# Patient Record
Sex: FEMALE | Race: WHITE | NOT HISPANIC OR LATINO | URBAN - METROPOLITAN AREA
[De-identification: names, ages, dates, MRNs, and addresses within clinical notes are randomized per-mention and may not be internally consistent; named-entity substitution may affect disease eponyms.]

---

## 2017-04-30 ENCOUNTER — EMERGENCY (EMERGENCY)
Facility: HOSPITAL | Age: 22
LOS: 1 days | Discharge: HOME | End: 2017-04-30
Attending: EMERGENCY MEDICINE | Admitting: EMERGENCY MEDICINE
Payer: MEDICAID

## 2017-04-30 VITALS
TEMPERATURE: 102 F | RESPIRATION RATE: 16 BRPM | OXYGEN SATURATION: 100 % | HEART RATE: 119 BPM | SYSTOLIC BLOOD PRESSURE: 103 MMHG | DIASTOLIC BLOOD PRESSURE: 68 MMHG

## 2017-04-30 DIAGNOSIS — M62.40 CONTRACTURE OF MUSCLE, UNSPECIFIED SITE: Chronic | ICD-10-CM

## 2017-04-30 DIAGNOSIS — Z98.89 OTHER SPECIFIED POSTPROCEDURAL STATES: Chronic | ICD-10-CM

## 2017-04-30 PROCEDURE — 99284 EMERGENCY DEPT VISIT MOD MDM: CPT | Mod: 25

## 2017-04-30 PROCEDURE — 93010 ELECTROCARDIOGRAM REPORT: CPT

## 2017-04-30 NOTE — ED ADULT TRIAGE NOTE - CHIEF COMPLAINT QUOTE
pt with complaints of throat pain, headache and dizziness since last night which has been getting progressively worse, pt noted to have temp 101.9 which she stated she had since last night as well, denies and sick contacts, able to tolerate PO at this time. pt has a history of seizures on Keppra, complaint with medications last seizure 3 years ago.  pt states the last time she had a headache and felt dizzy she had a seizure, denies chest pain and sob at this time

## 2017-05-01 VITALS
HEART RATE: 101 BPM | DIASTOLIC BLOOD PRESSURE: 69 MMHG | TEMPERATURE: 99 F | SYSTOLIC BLOOD PRESSURE: 124 MMHG | OXYGEN SATURATION: 100 % | RESPIRATION RATE: 22 BRPM

## 2017-05-01 RX ORDER — AZITHROMYCIN 500 MG/1
500 TABLET, FILM COATED ORAL ONCE
Qty: 0 | Refills: 0 | Status: COMPLETED | OUTPATIENT
Start: 2017-05-01 | End: 2017-05-01

## 2017-05-01 RX ORDER — DEXAMETHASONE 0.5 MG/5ML
10 ELIXIR ORAL ONCE
Qty: 0 | Refills: 0 | Status: DISCONTINUED | OUTPATIENT
Start: 2017-05-01 | End: 2017-05-01

## 2017-05-01 RX ORDER — IBUPROFEN 200 MG
800 TABLET ORAL ONCE
Qty: 0 | Refills: 0 | Status: COMPLETED | OUTPATIENT
Start: 2017-05-01 | End: 2017-05-01

## 2017-05-01 RX ORDER — AZITHROMYCIN 500 MG/1
1 TABLET, FILM COATED ORAL
Qty: 4 | Refills: 0 | OUTPATIENT
Start: 2017-05-01 | End: 2017-05-05

## 2017-05-01 RX ORDER — AZITHROMYCIN 500 MG/1
0.5 TABLET, FILM COATED ORAL
Qty: 2 | Refills: 0 | OUTPATIENT
Start: 2017-05-01 | End: 2017-05-05

## 2017-05-01 RX ORDER — KETOROLAC TROMETHAMINE 30 MG/ML
30 SYRINGE (ML) INJECTION ONCE
Qty: 0 | Refills: 0 | Status: DISCONTINUED | OUTPATIENT
Start: 2017-05-01 | End: 2017-05-01

## 2017-05-01 RX ORDER — SODIUM CHLORIDE 9 MG/ML
1000 INJECTION INTRAMUSCULAR; INTRAVENOUS; SUBCUTANEOUS ONCE
Qty: 0 | Refills: 0 | Status: DISCONTINUED | OUTPATIENT
Start: 2017-05-01 | End: 2017-05-01

## 2017-05-01 RX ORDER — DEXAMETHASONE 0.5 MG/5ML
6 ELIXIR ORAL ONCE
Qty: 0 | Refills: 0 | Status: COMPLETED | OUTPATIENT
Start: 2017-05-01 | End: 2017-05-01

## 2017-05-01 RX ORDER — ACETAMINOPHEN 500 MG
650 TABLET ORAL ONCE
Qty: 0 | Refills: 0 | Status: COMPLETED | OUTPATIENT
Start: 2017-05-01 | End: 2017-05-01

## 2017-05-01 RX ADMIN — Medication 650 MILLIGRAM(S): at 01:46

## 2017-05-01 RX ADMIN — AZITHROMYCIN 500 MILLIGRAM(S): 500 TABLET, FILM COATED ORAL at 01:31

## 2017-05-01 RX ADMIN — Medication 800 MILLIGRAM(S): at 01:47

## 2017-05-01 RX ADMIN — Medication 6 MILLIGRAM(S): at 01:46

## 2017-05-01 NOTE — ED PROVIDER NOTE - PSH
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

## 2017-05-01 NOTE — ED PROVIDER NOTE - CARE PLAN
Principal Discharge DX:	Strep pharyngitis  Instructions for follow-up, activity and diet:	The patient was given verbal and written discharge instructions. Specifically, instructions when to return to the ED and when to seek follow-up from their pcp was discussed. Any specialty follow-up was discussed, including how to make an appointment.  Instructions were discussed in simple, plain language and was understood by the patient. The patient understands that should their symptoms worsen or any new symptoms arise, they should return to the ED immediately for further evaluation.

## 2017-05-01 NOTE — ED ADULT NURSE NOTE - OBJECTIVE STATEMENT
pt received spot 20, alert and orientedx3. pt c.o headache dizziness fever starting last night, worsening today. pt states she has hx of seizures with similar symptoms. also c.o difficulty swallowing. sinus tach on cm. md silva at bedside. denies seizure activity. labs sent. will monitor.

## 2017-05-01 NOTE — ED PROVIDER NOTE - PROGRESS NOTE DETAILS
pt drank 2L water with subsequent improvement of hr. feels better, sore throat improved. Pt prepared for dc to home and will followup with PMD and take abx as prescribed.

## 2017-05-01 NOTE — ED PROVIDER NOTE - FAMILY HISTORY
Grandparent  Still living? Unknown  Family history of diabetes mellitus in grandmother, Age at diagnosis: Age Unknown  Family history of Alzheimer's disease, Age at diagnosis: Age Unknown

## 2017-05-01 NOTE — ED PROVIDER NOTE - ATTENDING CONTRIBUTION TO CARE
21F h/o athrogryposis, seizures presents with fever, sore throat and HA.  Reports 2d of symptoms.  ST is primarily on left side, more painful to swallow.  Reports HA is diffuse and is associated w fever, improves when fever is reduced.  No neck stiffness, no cough.  No rash, no n/v/d. No abd pain. In the past has had seizures triggered by fevers, no seizures now.  On exam well appearing, nad, no drooling, normal voice, no trismus, + L mild tonilsar erythema, scattered exudate, + L anterior LAD, mmm, lungs clear, tachycardic, abd soft, no rash, no edema, 2+ pulses, alert, speech clear.  Symptoms c/w strep pharyngitis, will treat w abx.  + tachycardia likely due to fever, dehydration, IV infiltrated and pt requested PO fluids (difficult to obtain access), tachycardia improved with antipyretics, fluids, mild persistent tachycardia but symptomatically improved, given strict instructions for rehydration and close f/u in 1-2 days w PCP, strict return precautions.

## 2017-05-01 NOTE — ED PROVIDER NOTE - OBJECTIVE STATEMENT
21yF hx epilepsy (takes keppra), arthrogyposis p/w throat pain, fever, chills, headache, lightheadedness since yesterday night. The last time pt had a seizure (3yrs ago) she had similar symptoms aside for the sore throat. Throat pain is worse when swallowing but pt able to swallow and endorses no difficulty breathing. No known sick contacts or recent travel. Headache gradual onset, not worse with movement or light, waxing and waning, similar to headaches she has had in past, not severe. No neck stiffness. No motor, sensory, or visual deficits.

## 2017-05-02 LAB
LEVETIRACETAM SERPL-MCNC: 17.9 MCG/ML — SIGNIFICANT CHANGE UP (ref 12–46)
SPECIMEN SOURCE: SIGNIFICANT CHANGE UP
SPECIMEN SOURCE: SIGNIFICANT CHANGE UP

## 2017-05-06 LAB
BACTERIA BLD CULT: SIGNIFICANT CHANGE UP
BACTERIA BLD CULT: SIGNIFICANT CHANGE UP

## 2017-07-17 ENCOUNTER — APPOINTMENT (OUTPATIENT)
Dept: NEUROLOGY | Facility: CLINIC | Age: 22
End: 2017-07-17

## 2017-07-17 VITALS
HEART RATE: 75 BPM | WEIGHT: 246 LBS | SYSTOLIC BLOOD PRESSURE: 120 MMHG | DIASTOLIC BLOOD PRESSURE: 74 MMHG | HEIGHT: 64 IN | BODY MASS INDEX: 42 KG/M2

## 2017-07-18 ENCOUNTER — MEDICATION RENEWAL (OUTPATIENT)
Age: 22
End: 2017-07-18

## 2018-01-16 ENCOUNTER — MEDICATION RENEWAL (OUTPATIENT)
Age: 23
End: 2018-01-16

## 2018-10-24 ENCOUNTER — MEDICATION RENEWAL (OUTPATIENT)
Age: 23
End: 2018-10-24

## 2019-06-05 ENCOUNTER — MEDICATION RENEWAL (OUTPATIENT)
Age: 24
End: 2019-06-05

## 2019-06-25 ENCOUNTER — APPOINTMENT (OUTPATIENT)
Dept: OBGYN | Facility: HOSPITAL | Age: 24
End: 2019-06-25

## 2019-07-11 ENCOUNTER — APPOINTMENT (OUTPATIENT)
Dept: OBGYN | Facility: HOSPITAL | Age: 24
End: 2019-07-11

## 2019-08-20 ENCOUNTER — LABORATORY RESULT (OUTPATIENT)
Age: 24
End: 2019-08-20

## 2019-08-20 ENCOUNTER — OUTPATIENT (OUTPATIENT)
Dept: OUTPATIENT SERVICES | Facility: HOSPITAL | Age: 24
LOS: 1 days | End: 2019-08-20

## 2019-08-20 ENCOUNTER — APPOINTMENT (OUTPATIENT)
Dept: OBGYN | Facility: HOSPITAL | Age: 24
End: 2019-08-20
Payer: MEDICAID

## 2019-08-20 VITALS
HEIGHT: 64 IN | BODY MASS INDEX: 46.44 KG/M2 | WEIGHT: 272 LBS | HEART RATE: 64 BPM | DIASTOLIC BLOOD PRESSURE: 76 MMHG | SYSTOLIC BLOOD PRESSURE: 133 MMHG

## 2019-08-20 DIAGNOSIS — Z98.89 OTHER SPECIFIED POSTPROCEDURAL STATES: Chronic | ICD-10-CM

## 2019-08-20 DIAGNOSIS — Z00.00 ENCOUNTER FOR GENERAL ADULT MEDICAL EXAMINATION W/OUT ABNORMAL FINDINGS: ICD-10-CM

## 2019-08-20 DIAGNOSIS — M62.40 CONTRACTURE OF MUSCLE, UNSPECIFIED SITE: Chronic | ICD-10-CM

## 2019-08-20 LAB
ALBUMIN SERPL ELPH-MCNC: 4.6 G/DL — SIGNIFICANT CHANGE UP (ref 3.3–5)
ALP SERPL-CCNC: 128 U/L — HIGH (ref 40–120)
ALT FLD-CCNC: 14 U/L — SIGNIFICANT CHANGE UP (ref 4–33)
ANION GAP SERPL CALC-SCNC: 15 MMO/L — HIGH (ref 7–14)
AST SERPL-CCNC: 15 U/L — SIGNIFICANT CHANGE UP (ref 4–32)
BASOPHILS # BLD AUTO: 0.04 K/UL — SIGNIFICANT CHANGE UP (ref 0–0.2)
BASOPHILS NFR BLD AUTO: 0.3 % — SIGNIFICANT CHANGE UP (ref 0–2)
BILIRUB SERPL-MCNC: < 0.2 MG/DL — LOW (ref 0.2–1.2)
BUN SERPL-MCNC: 9 MG/DL — SIGNIFICANT CHANGE UP (ref 7–23)
CALCIUM SERPL-MCNC: 9.9 MG/DL — SIGNIFICANT CHANGE UP (ref 8.4–10.5)
CHLORIDE SERPL-SCNC: 100 MMOL/L — SIGNIFICANT CHANGE UP (ref 98–107)
CO2 SERPL-SCNC: 25 MMOL/L — SIGNIFICANT CHANGE UP (ref 22–31)
CREAT SERPL-MCNC: 0.84 MG/DL — SIGNIFICANT CHANGE UP (ref 0.5–1.3)
EOSINOPHIL # BLD AUTO: 0.19 K/UL — SIGNIFICANT CHANGE UP (ref 0–0.5)
EOSINOPHIL NFR BLD AUTO: 1.5 % — SIGNIFICANT CHANGE UP (ref 0–6)
GLUCOSE SERPL-MCNC: 84 MG/DL — SIGNIFICANT CHANGE UP (ref 70–99)
HBA1C BLD-MCNC: 5.1 % — SIGNIFICANT CHANGE UP (ref 4–5.6)
HCT VFR BLD CALC: 37.4 % — SIGNIFICANT CHANGE UP (ref 34.5–45)
HGB BLD-MCNC: 11 G/DL — LOW (ref 11.5–15.5)
IMM GRANULOCYTES NFR BLD AUTO: 0.3 % — SIGNIFICANT CHANGE UP (ref 0–1.5)
LYMPHOCYTES # BLD AUTO: 32.4 % — SIGNIFICANT CHANGE UP (ref 13–44)
LYMPHOCYTES # BLD AUTO: 4.22 K/UL — HIGH (ref 1–3.3)
MCHC RBC-ENTMCNC: 22.6 PG — LOW (ref 27–34)
MCHC RBC-ENTMCNC: 29.4 % — LOW (ref 32–36)
MCV RBC AUTO: 77 FL — LOW (ref 80–100)
MONOCYTES # BLD AUTO: 0.83 K/UL — SIGNIFICANT CHANGE UP (ref 0–0.9)
MONOCYTES NFR BLD AUTO: 6.4 % — SIGNIFICANT CHANGE UP (ref 2–14)
NEUTROPHILS # BLD AUTO: 7.72 K/UL — HIGH (ref 1.8–7.4)
NEUTROPHILS NFR BLD AUTO: 59.1 % — SIGNIFICANT CHANGE UP (ref 43–77)
NRBC # FLD: 0 K/UL — SIGNIFICANT CHANGE UP (ref 0–0)
PLATELET # BLD AUTO: 609 K/UL — HIGH (ref 150–400)
PMV BLD: 9.5 FL — SIGNIFICANT CHANGE UP (ref 7–13)
POTASSIUM SERPL-MCNC: 3.8 MMOL/L — SIGNIFICANT CHANGE UP (ref 3.5–5.3)
POTASSIUM SERPL-SCNC: 3.8 MMOL/L — SIGNIFICANT CHANGE UP (ref 3.5–5.3)
PROT SERPL-MCNC: 8.8 G/DL — HIGH (ref 6–8.3)
RBC # BLD: 4.86 M/UL — SIGNIFICANT CHANGE UP (ref 3.8–5.2)
RBC # FLD: 16.4 % — HIGH (ref 10.3–14.5)
SODIUM SERPL-SCNC: 140 MMOL/L — SIGNIFICANT CHANGE UP (ref 135–145)
T4 FREE SERPL-MCNC: 1.25 NG/DL — SIGNIFICANT CHANGE UP (ref 0.9–1.8)
TSH SERPL-MCNC: 1.12 UIU/ML — SIGNIFICANT CHANGE UP (ref 0.27–4.2)
WBC # BLD: 13.04 K/UL — HIGH (ref 3.8–10.5)
WBC # FLD AUTO: 13.04 K/UL — HIGH (ref 3.8–10.5)

## 2019-08-20 PROCEDURE — 99213 OFFICE O/P EST LOW 20 MIN: CPT | Mod: GC

## 2019-08-20 RX ORDER — TERCONAZOLE 8 MG/G
0.8 CREAM VAGINAL
Qty: 1 | Refills: 0 | Status: ACTIVE | COMMUNITY
Start: 2019-08-20 | End: 1900-01-01

## 2019-08-20 NOTE — PHYSICAL EXAM
[Awake] : awake [Alert] : alert [Oriented x3] : oriented to person, place, and time [No Lesions] : no genitalia lesions [Normal] : external genitalia [Labia Majora] : labia major [Discharge] : a  ~M vaginal discharge was present [Moderate] : moderate [Melia] : yellow [Thick] : thick [Mass] : no breast mass [Acute Distress] : no acute distress [Tender] : no tenderness [Nipple Discharge] : no nipple discharge [Axillary LAD] : no axillary lymphadenopathy [Depressed Mood] : not depressed [Flat Affect] : affect not flat [Foul Smelling] : not foul smelling [FreeTextEntry6] : deferred

## 2019-08-21 DIAGNOSIS — Z00.00 ENCOUNTER FOR GENERAL ADULT MEDICAL EXAMINATION WITHOUT ABNORMAL FINDINGS: ICD-10-CM

## 2019-08-21 DIAGNOSIS — N92.6 IRREGULAR MENSTRUATION, UNSPECIFIED: ICD-10-CM

## 2019-08-21 DIAGNOSIS — N89.8 OTHER SPECIFIED NONINFLAMMATORY DISORDERS OF VAGINA: ICD-10-CM

## 2019-08-22 ENCOUNTER — APPOINTMENT (OUTPATIENT)
Dept: ULTRASOUND IMAGING | Facility: CLINIC | Age: 24
End: 2019-08-22

## 2019-08-22 ENCOUNTER — CHART COPY (OUTPATIENT)
Age: 24
End: 2019-08-22

## 2019-08-22 ENCOUNTER — OUTPATIENT (OUTPATIENT)
Dept: OUTPATIENT SERVICES | Facility: HOSPITAL | Age: 24
LOS: 1 days | End: 2019-08-22
Payer: MEDICAID

## 2019-08-22 DIAGNOSIS — N76.0 ACUTE VAGINITIS: ICD-10-CM

## 2019-08-22 DIAGNOSIS — M62.40 CONTRACTURE OF MUSCLE, UNSPECIFIED SITE: Chronic | ICD-10-CM

## 2019-08-22 DIAGNOSIS — B96.89 ACUTE VAGINITIS: ICD-10-CM

## 2019-08-22 DIAGNOSIS — Z00.8 ENCOUNTER FOR OTHER GENERAL EXAMINATION: ICD-10-CM

## 2019-08-22 DIAGNOSIS — Z98.89 OTHER SPECIFIED POSTPROCEDURAL STATES: Chronic | ICD-10-CM

## 2019-08-22 LAB
CANDIDA AB TITR SER: DETECTED — SIGNIFICANT CHANGE UP
G VAGINALIS DNA SPEC QL NAA+PROBE: DETECTED — SIGNIFICANT CHANGE UP
T VAGINALIS SPEC QL WET PREP: NOT DETECTED — SIGNIFICANT CHANGE UP

## 2019-08-22 PROCEDURE — 76856 US EXAM PELVIC COMPLETE: CPT

## 2019-08-22 PROCEDURE — 76856 US EXAM PELVIC COMPLETE: CPT | Mod: 26

## 2019-09-03 ENCOUNTER — APPOINTMENT (OUTPATIENT)
Dept: OBGYN | Facility: HOSPITAL | Age: 24
End: 2019-09-03
Payer: MEDICAID

## 2019-09-03 ENCOUNTER — OUTPATIENT (OUTPATIENT)
Dept: OUTPATIENT SERVICES | Facility: HOSPITAL | Age: 24
LOS: 1 days | End: 2019-09-03

## 2019-09-03 VITALS
BODY MASS INDEX: 46.61 KG/M2 | HEIGHT: 64 IN | SYSTOLIC BLOOD PRESSURE: 124 MMHG | WEIGHT: 273 LBS | HEART RATE: 86 BPM | DIASTOLIC BLOOD PRESSURE: 73 MMHG

## 2019-09-03 DIAGNOSIS — M62.40 CONTRACTURE OF MUSCLE, UNSPECIFIED SITE: Chronic | ICD-10-CM

## 2019-09-03 DIAGNOSIS — Z98.89 OTHER SPECIFIED POSTPROCEDURAL STATES: Chronic | ICD-10-CM

## 2019-09-03 DIAGNOSIS — E28.2 POLYCYSTIC OVARIAN SYNDROME: ICD-10-CM

## 2019-09-03 PROCEDURE — 99213 OFFICE O/P EST LOW 20 MIN: CPT | Mod: GE

## 2019-12-27 ENCOUNTER — RX RENEWAL (OUTPATIENT)
Age: 24
End: 2019-12-27

## 2020-10-02 ENCOUNTER — APPOINTMENT (OUTPATIENT)
Dept: NEUROLOGY | Facility: CLINIC | Age: 25
End: 2020-10-02

## 2020-10-30 ENCOUNTER — APPOINTMENT (OUTPATIENT)
Dept: NEUROLOGY | Facility: CLINIC | Age: 25
End: 2020-10-30
Payer: MEDICAID

## 2020-10-30 VITALS
DIASTOLIC BLOOD PRESSURE: 87 MMHG | BODY MASS INDEX: 47.8 KG/M2 | HEART RATE: 96 BPM | HEIGHT: 64 IN | SYSTOLIC BLOOD PRESSURE: 126 MMHG | WEIGHT: 280 LBS

## 2020-10-30 PROCEDURE — 95816 EEG AWAKE AND DROWSY: CPT

## 2020-10-30 PROCEDURE — 99205 OFFICE O/P NEW HI 60 MIN: CPT

## 2020-11-02 NOTE — ASSESSMENT
[FreeTextEntry1] : 26 yo RH woman with focal epilepsy since age 11, seizure free x 7 years, clinically stable on Keppra monotherapy.   Patient agrees to continued seizure prophylaxis.\par \par Plan:\par 1. Continue Levetiracetam 750mg PO BID\par 2. Folate supplementation\par 3. Seizure precautions discussed.  Issues related to epilepsy in women and the risks/benefits of AEDs in future pregnancies was discussed at length.\par 4. Patient agrees with plan.\par 5. Follow up in 1 year.\par \par Kevin Mchugh MD\par [facility]\par \par Greater than 50% of the encounter was spent on counseling and coordination of care discussing differential diagnosis, diagnostic testing, and treatment options. We have talked about appropriate follow up, and I have spent 45 minutes of face to face time with the patient.\par

## 2020-11-02 NOTE — PHYSICAL EXAM
[FreeTextEntry1] : Awake, alert, oriented x 3.  Language fluent.  Comprehension intact.  Naming intact.  Repetition intact.  Affect normal.  Cranial nerves grossly intact.  Motor exam: +arthrogryposis affecting hands and feet, with limited mobility and dexterity in distal upper extremities, moves all 4 extremities symmetrically, normal bulk, normal tone. No tremors or fasciculations.  Sensory exam: intact to LT/PP/DEB/vibration.  DTRs: 2+ throughout, flexor plantar response bilaterally, no clonus.  Coordination: no dysmetria.  Gait: stable gait.  Romberg - negative\par

## 2020-11-02 NOTE — HISTORY OF PRESENT ILLNESS
[FreeTextEntry1] : 26 yo RH woman with a history of focal epilepsy since the age of 11.  Her last seizure was about 7 years ago, and she has been compliant on Keppra monotherapy.  She also has arthrogryposis affecting the distal limbs and limiting hand dexterity.\par \par Her Keppra had been switched to brand only in the past due to suspected side effects, but now she is back on generic Levetiracetam, feeling fine on it.\par \par She has been having frequent headaches, often provoked by stress.  Currently occurring about once a week.  The pain is posterior, can be throbbing or a pressure sensation.  No nausea or vomiting.  +phonophobia.  Duration is about 1 hour.  She takes Excederin prn which is effective. \par \par Her mother gets migraine headaches, and her father also gets migraine headaches. \par \par Seizure description: she gets an aura of "dizziness" then loses awareness, and may have a secondary GTC seizure. \par \par Epilepsy Risk Factors:  complications and born pre-mature, 7 months, but no febrile seizures, no developmental delay, no head trauma, no meningitis or encephalitis, no stroke  and no family history of seizures \par \par EEG (done today and in the past have been normal)\par \par MRI brain (): normal\par \par Current AEDs:\par Levetiracetam 750mg PO BID\par \par Previous AEDs:\par None

## 2020-12-21 PROBLEM — N76.0 BACTERIAL VAGINOSIS: Status: RESOLVED | Noted: 2019-08-22 | Resolved: 2020-12-21

## 2020-12-28 ENCOUNTER — APPOINTMENT (OUTPATIENT)
Dept: NEUROLOGY | Facility: CLINIC | Age: 25
End: 2020-12-28
Payer: MEDICAID

## 2020-12-28 VITALS
HEART RATE: 101 BPM | DIASTOLIC BLOOD PRESSURE: 84 MMHG | BODY MASS INDEX: 47.8 KG/M2 | WEIGHT: 280 LBS | HEIGHT: 64 IN | SYSTOLIC BLOOD PRESSURE: 122 MMHG

## 2020-12-28 VITALS — TEMPERATURE: 95.3 F

## 2020-12-28 PROCEDURE — 99072 ADDL SUPL MATRL&STAF TM PHE: CPT

## 2020-12-28 PROCEDURE — 99214 OFFICE O/P EST MOD 30 MIN: CPT

## 2020-12-28 NOTE — HISTORY OF PRESENT ILLNESS
[FreeTextEntry1] : ***UPDATE:2020***\par Ms Anahi Hassan is here today for a follow up visit. Her last documented seizure was ~ 7 years ago\par recent REEG in October -normal with no epileptiform discharges\par \par Levetiracetam 1000/1500\par \par \par \par 26 yo RH woman with a history of focal epilepsy since the age of 11.  Her last seizure was about 7 years ago, and she has been compliant on Keppra monotherapy.  She also has arthrogryposis affecting the distal limbs and limiting hand dexterity.\par \par Her Keppra had been switched to brand only in the past due to suspected side effects, but now she is back on generic Levetiracetam, feeling fine on it.\par \par She has been having frequent headaches, often provoked by stress.  Currently occurring about once a week.  The pain is posterior, can be throbbing or a pressure sensation.  No nausea or vomiting.  +phonophobia.  Duration is about 1 hour.  She takes Excederin prn which is effective. \par \par Her mother gets migraine headaches, and her father also gets migraine headaches. \par \par Seizure description: she gets an aura of "dizziness" then loses awareness, and may have a secondary GTC seizure. \par \par Epilepsy Risk Factors:  complications and born pre-mature, 7 months, but no febrile seizures, no developmental delay, no head trauma, no meningitis or encephalitis, no stroke  and no family history of seizures \par \par EEG (done today and in the past have been normal)\par \par MRI brain (): normal\par \par Current AEDs:\par Levetiracetam 750mg PO BID\par \par Previous AEDs:\par None

## 2020-12-28 NOTE — ASSESSMENT
[FreeTextEntry1] : 24 yo RH woman with focal epilepsy since age 11, seizure free x 7 years, clinically stable on Keppra monotherapy.   Patient agrees to continued seizure prophylaxis.\par \par Plan:\par 1. Continue Levetiracetam 750mg PO BID   folic acid\par 2. annual labwork including AED level \par 3. reviewed seizure triggers   not currently planning pregnancy\par 4 DMV forms completed\par 5. Follow up in 1 year.\par

## 2021-12-28 ENCOUNTER — APPOINTMENT (OUTPATIENT)
Dept: NEUROLOGY | Facility: CLINIC | Age: 26
End: 2021-12-28

## 2022-05-31 ENCOUNTER — APPOINTMENT (OUTPATIENT)
Dept: NEUROLOGY | Facility: CLINIC | Age: 27
End: 2022-05-31

## 2022-05-31 ENCOUNTER — APPOINTMENT (OUTPATIENT)
Dept: NEUROLOGY | Facility: CLINIC | Age: 27
End: 2022-05-31
Payer: MEDICAID

## 2022-05-31 PROCEDURE — 99212 OFFICE O/P EST SF 10 MIN: CPT

## 2022-05-31 PROCEDURE — 99202 OFFICE O/P NEW SF 15 MIN: CPT | Mod: 95

## 2022-05-31 RX ORDER — FOLIC ACID 1 MG/1
1 TABLET ORAL DAILY
Qty: 30 | Refills: 5 | Status: ACTIVE | COMMUNITY
Start: 2020-10-30 | End: 1900-01-01

## 2022-05-31 RX ORDER — LEVETIRACETAM 750 MG/1
750 TABLET, FILM COATED ORAL TWICE DAILY
Qty: 60 | Refills: 5 | Status: ACTIVE | COMMUNITY
Start: 2020-10-30 | End: 1900-01-01

## 2022-06-02 NOTE — ASSESSMENT
[FreeTextEntry1] : 28 yo RH woman with focal epilepsy since age 11, seizure free x 7 years, clinically stable on Keppra monotherapy.   Patient agrees to continued seizure prophylaxis.\par \par Plan:\par 1. Continue Levetiracetam 750mg PO BID   folic acid\par 2. annual labwork including AED level \par 3. reviewed seizure triggers   not currently planning pregnancy\par 4. Follow up in 1 year with Dr Mchugh  (in office)\par

## 2022-07-21 ENCOUNTER — APPOINTMENT (OUTPATIENT)
Dept: OBGYN | Facility: HOSPITAL | Age: 27
End: 2022-07-21

## 2022-07-21 ENCOUNTER — RESULT REVIEW (OUTPATIENT)
Age: 27
End: 2022-07-21

## 2022-07-21 ENCOUNTER — OUTPATIENT (OUTPATIENT)
Dept: OUTPATIENT SERVICES | Facility: HOSPITAL | Age: 27
LOS: 1 days | End: 2022-07-21

## 2022-07-21 VITALS
HEART RATE: 110 BPM | DIASTOLIC BLOOD PRESSURE: 77 MMHG | BODY MASS INDEX: 49.17 KG/M2 | TEMPERATURE: 98 F | HEIGHT: 64 IN | SYSTOLIC BLOOD PRESSURE: 138 MMHG | WEIGHT: 288 LBS

## 2022-07-21 DIAGNOSIS — Z01.419 ENCOUNTER FOR GYNECOLOGICAL EXAMINATION (GENERAL) (ROUTINE) WITHOUT ABNORMAL FINDINGS: ICD-10-CM

## 2022-07-21 DIAGNOSIS — Z30.09 ENCOUNTER FOR OTHER GENERAL COUNSELING AND ADVICE ON CONTRACEPTION: ICD-10-CM

## 2022-07-21 DIAGNOSIS — G40.909 EPILEPSY, UNSPECIFIED, NOT INTRACTABLE, W/OUT STATUS EPILEPTICUS: ICD-10-CM

## 2022-07-21 DIAGNOSIS — E66.9 OBESITY, UNSPECIFIED: ICD-10-CM

## 2022-07-21 DIAGNOSIS — M62.40 CONTRACTURE OF MUSCLE, UNSPECIFIED SITE: Chronic | ICD-10-CM

## 2022-07-21 DIAGNOSIS — Z98.89 OTHER SPECIFIED POSTPROCEDURAL STATES: Chronic | ICD-10-CM

## 2022-07-21 LAB
HCG SERPL-ACNC: <5 MIU/ML — SIGNIFICANT CHANGE UP
T4 FREE SERPL-MCNC: 1.3 NG/DL — SIGNIFICANT CHANGE UP (ref 0.9–1.8)
TSH SERPL-MCNC: 1.05 UIU/ML — SIGNIFICANT CHANGE UP (ref 0.27–4.2)

## 2022-07-21 PROCEDURE — 99395 PREV VISIT EST AGE 18-39: CPT | Mod: GC

## 2022-07-21 PROCEDURE — 99213 OFFICE O/P EST LOW 20 MIN: CPT | Mod: GC,25

## 2022-07-21 RX ORDER — NITROFURANTOIN (MONOHYDRATE/MACROCRYSTALS) 25; 75 MG/1; MG/1
100 CAPSULE ORAL
Qty: 14 | Refills: 0 | Status: DISCONTINUED | COMMUNITY
Start: 2019-08-22 | End: 2022-07-21

## 2022-07-22 ENCOUNTER — NON-APPOINTMENT (OUTPATIENT)
Age: 27
End: 2022-07-22

## 2022-07-22 DIAGNOSIS — N92.6 IRREGULAR MENSTRUATION, UNSPECIFIED: ICD-10-CM

## 2022-07-22 DIAGNOSIS — G40.909 EPILEPSY, UNSPECIFIED, NOT INTRACTABLE, WITHOUT STATUS EPILEPTICUS: ICD-10-CM

## 2022-07-22 DIAGNOSIS — E66.9 OBESITY, UNSPECIFIED: ICD-10-CM

## 2022-07-22 LAB
C TRACH RRNA SPEC QL NAA+PROBE: SIGNIFICANT CHANGE UP
C TRACH+GC RRNA SPEC QL PROBE: SIGNIFICANT CHANGE UP
N GONORRHOEA RRNA SPEC QL NAA+PROBE: SIGNIFICANT CHANGE UP
PROLACTIN SERPL-MCNC: 21.3 NG/ML — SIGNIFICANT CHANGE UP (ref 3.4–24.1)

## 2022-07-26 LAB — CYTOLOGY SPEC DOC CYTO: SIGNIFICANT CHANGE UP

## 2022-08-03 RX ORDER — TERCONAZOLE 8 MG/G
0.8 CREAM VAGINAL
Qty: 1 | Refills: 1 | Status: ACTIVE | COMMUNITY
Start: 2022-08-03 | End: 1900-01-01

## 2022-08-06 NOTE — PHYSICAL EXAM
[Appropriately responsive] : appropriately responsive [Alert] : alert [No Acute Distress] : no acute distress [No Lymphadenopathy] : no lymphadenopathy [Soft] : soft [Non-tender] : non-tender [Non-distended] : non-distended [Oriented x3] : oriented x3 [Examination Of The Breasts] : a normal appearance [No Masses] : no breast masses were palpable [Labia Majora] : normal [Labia Minora] : normal [Scant] : There was scant vaginal bleeding [Normal] : normal [Uterine Adnexae] : non-palpable [FreeTextEntry6] : no masses, lesions, non-tender, no axillary lymphadenopathy, no nipple discharge or retraction

## 2022-08-06 NOTE — REVIEW OF SYSTEMS
[Fatigue] : fatigue [Abn Vaginal bleeding] : abnormal vaginal bleeding [Negative] : Cardiovascular [Frequency] : no frequency [Incontinence] : no incontinence [Dysuria] : no dysuria [Pelvic pain] : no pelvic pain [Genital Rash/Irritation] : no genital rash/irritation [Breast Pain] : no breast pain [Breast Lump] : no breast lump [Nipple Changes] : no nipple changes [Nipple Discharge] : no nipple discharge [Headache] : no headache [Dizziness] : no dizziness [Convulsions] : no convulsions

## 2022-08-06 NOTE — PLAN
[FreeTextEntry1] : 28 yo G0, LMP unknown, presenting today for annual gyn exam and with complaints of irregular periods \par \par #HCM\par - speculum and bimanual exam in office; wnl\par - pap smear in office today; f/u results\par - GC/CT swab; f/u results\par - breast exam wnl, no further w/u indicated at this time \par \par #Irregular periods\par - counseled on different contraception options, pros/cons reviewed. Pt opting for OCPs \par - will prescribe Apri birth control pills today, serum hCG in office today. Will start pills after negative bHCG; f/u 3 months afters starting pills \par - will send CBC, TSH, T4, Prolactin today \par - complete pelvic sono ordered \par - counseled extensively on weight loss strategies, diet changes. Discussed possible Weight Watchers for diet. Pt will meet with dietician; consult ordered \par -RTC in 3 months\par \par seen and evaluated with Dr. Fagan \matthew Lopez, PGY-2

## 2022-08-06 NOTE — END OF VISIT
[] : Resident [FreeTextEntry3] : Pt seen and evaluated by me. Agree with resident findings, assessment and plan documented in resident note.

## 2022-08-06 NOTE — HISTORY OF PRESENT ILLNESS
[FreeTextEntry1] : 26 yo G0, LMP unclear, presenting today for annual gyn exam. Has not been seen at clinic since 2019 and has not seen any other gyn in the interim. Also reporting irregular bleeding for the past 4 years which have become progressively worse. Reports periods that last ~1 month with 1-2 weeks of no bleeding in between cycles. Reports ~2 days of heavy bleeding with the remaining days of the "period" consisting of spotting. This has been going on since May of this year. Prior to May, periods would last ~20 days with 10 days of heavy flow and 1-2 weeks of no bleeding. At the heaviest, has to change pads 3x/day. Otherwise denies vaginal itching/burning, dysuria. \par \par Pt was seen in 2019 for irregular bleeding. At that time, patient was recommended OCPs but declined and opted for lifestyle changes (diet, exercise) instead. However, patient has not had any weight loss in that time period.\par \par Pt recently became sexually active after 10 years of abstinence with a male partner. Has had sex twice and reports some spotting and burning after intercourse. Pt is using store bought lubricant but is unaware of the ingredients or brand. Has had sex twice since April. \par \par On ROS, patient reports feelings of occasional fatigue and anxiety. \par \par  \par \par OBHx: G0 \par Gyn Hx: PCOS\par last pap 2019 at outside facility. Reports normal \par \par PMSH: Epilepsy, follows with neurologist Darlene Caro. On Keppra 750mg BID. Last seizure 10 years ago \par \par PSH: surgery for club foot, hands for contractures \par \par All: Amoxicillin (rash)\par \par Meds: Keppra, Folic Acid \par \par Social: Lives at home with mother. Works as mental health counselor

## 2022-10-24 ENCOUNTER — APPOINTMENT (OUTPATIENT)
Dept: OBGYN | Facility: HOSPITAL | Age: 27
End: 2022-10-24

## 2022-10-24 ENCOUNTER — RESULT REVIEW (OUTPATIENT)
Age: 27
End: 2022-10-24

## 2022-10-24 ENCOUNTER — OUTPATIENT (OUTPATIENT)
Dept: OUTPATIENT SERVICES | Facility: HOSPITAL | Age: 27
LOS: 1 days | End: 2022-10-24

## 2022-10-24 VITALS
HEIGHT: 64 IN | SYSTOLIC BLOOD PRESSURE: 125 MMHG | DIASTOLIC BLOOD PRESSURE: 75 MMHG | HEART RATE: 93 BPM | WEIGHT: 279 LBS | BODY MASS INDEX: 47.63 KG/M2 | TEMPERATURE: 97.9 F

## 2022-10-24 DIAGNOSIS — N92.6 IRREGULAR MENSTRUATION, UNSPECIFIED: ICD-10-CM

## 2022-10-24 DIAGNOSIS — Z98.89 OTHER SPECIFIED POSTPROCEDURAL STATES: Chronic | ICD-10-CM

## 2022-10-24 DIAGNOSIS — M62.40 CONTRACTURE OF MUSCLE, UNSPECIFIED SITE: Chronic | ICD-10-CM

## 2022-10-24 DIAGNOSIS — N89.8 OTHER SPECIFIED NONINFLAMMATORY DISORDERS OF VAGINA: ICD-10-CM

## 2022-10-24 LAB
APPEARANCE UR: CLEAR — SIGNIFICANT CHANGE UP
BACTERIA # UR AUTO: ABNORMAL
BILIRUB UR-MCNC: NEGATIVE — SIGNIFICANT CHANGE UP
COLOR SPEC: YELLOW — SIGNIFICANT CHANGE UP
DIFF PNL FLD: NEGATIVE — SIGNIFICANT CHANGE UP
EPI CELLS # UR: 7 /HPF — HIGH (ref 0–5)
GLUCOSE UR QL: NEGATIVE — SIGNIFICANT CHANGE UP
HYALINE CASTS # UR AUTO: 4 /LPF — SIGNIFICANT CHANGE UP (ref 0–7)
KETONES UR-MCNC: ABNORMAL
LEUKOCYTE ESTERASE UR-ACNC: ABNORMAL
NITRITE UR-MCNC: NEGATIVE — SIGNIFICANT CHANGE UP
PH UR: 6 — SIGNIFICANT CHANGE UP (ref 5–8)
PROT UR-MCNC: ABNORMAL
RBC CASTS # UR COMP ASSIST: 14 /HPF — HIGH (ref 0–4)
SP GR SPEC: 1.04 — SIGNIFICANT CHANGE UP (ref 1.01–1.05)
UROBILINOGEN FLD QL: SIGNIFICANT CHANGE UP
WBC UR QL: 25 /HPF — HIGH (ref 0–5)

## 2022-10-24 PROCEDURE — 99213 OFFICE O/P EST LOW 20 MIN: CPT | Mod: GE

## 2022-10-25 DIAGNOSIS — N92.6 IRREGULAR MENSTRUATION, UNSPECIFIED: ICD-10-CM

## 2022-10-25 DIAGNOSIS — N89.8 OTHER SPECIFIED NONINFLAMMATORY DISORDERS OF VAGINA: ICD-10-CM

## 2022-10-25 LAB
C TRACH RRNA SPEC QL NAA+PROBE: SIGNIFICANT CHANGE UP
CULTURE RESULTS: SIGNIFICANT CHANGE UP
N GONORRHOEA RRNA SPEC QL NAA+PROBE: SIGNIFICANT CHANGE UP
SPECIMEN SOURCE: SIGNIFICANT CHANGE UP
SPECIMEN SOURCE: SIGNIFICANT CHANGE UP

## 2022-10-25 NOTE — HISTORY OF PRESENT ILLNESS
[FreeTextEntry1] : Ms. Hassan is a 27y G0 w/ MPH notable for PCOS who presents today for follow up of abnormal uterine bleeding. She was last seen 07/2022 when she was complaining of menses of prolonged duration (~ 2-4 weeks). CBC, thyroid studies, and prolactin were ordered; all labs were found to be within normal limits, TVUS was not performed. Pt was started on Apri and reports she has been complaint and her menses has now become regulated, she would like refills. LMP 10/14 - 10/20. \par \par She is also complaining of urinary frequency, vaginal burning during sex, and new onset yellow vaginal discharge. She denies any hematuria, dysuria, fever, or chills.

## 2022-10-25 NOTE — PHYSICAL EXAM
[Appropriately responsive] : appropriately responsive [Alert] : alert [No Acute Distress] : no acute distress [Oriented x3] : oriented x3 [Labia Majora] : normal [Labia Minora] : normal [Normal] : normal [Discharge] : a  ~M vaginal discharge was present [Moderate] : moderate [Melia] : yellow [Thick] : thick [FreeTextEntry5] : breathing comfortably on room air  [Foul Smelling] : not foul smelling [FreeTextEntry4] : thick yellow discharge at introitus  [FreeTextEntry8] : bimanual and speculum exam declined

## 2022-10-26 LAB
CANDIDA AB TITR SER: SIGNIFICANT CHANGE UP
G VAGINALIS DNA SPEC QL NAA+PROBE: DETECTED
T VAGINALIS SPEC QL WET PREP: SIGNIFICANT CHANGE UP

## 2022-11-04 DIAGNOSIS — B96.89 ACUTE VAGINITIS: ICD-10-CM

## 2022-11-04 DIAGNOSIS — N76.0 ACUTE VAGINITIS: ICD-10-CM

## 2022-11-04 RX ORDER — CLINDAMYCIN PHOSPHATE 20 MG/G
2 CREAM VAGINAL
Qty: 1 | Refills: 0 | Status: COMPLETED | COMMUNITY
Start: 2022-11-04 | End: 2022-11-11

## 2022-12-09 RX ORDER — DESOGESTREL AND ETHINYL ESTRADIOL 0.15-0.03
0.15-3 KIT ORAL DAILY
Qty: 1 | Refills: 5 | Status: ACTIVE | COMMUNITY
Start: 2022-07-21 | End: 1900-01-01

## 2023-01-24 ENCOUNTER — APPOINTMENT (OUTPATIENT)
Dept: NEUROLOGY | Facility: CLINIC | Age: 28
End: 2023-01-24

## 2023-01-26 ENCOUNTER — APPOINTMENT (OUTPATIENT)
Dept: NEUROLOGY | Facility: CLINIC | Age: 28
End: 2023-01-26
Payer: COMMERCIAL

## 2023-01-26 PROCEDURE — 99202 OFFICE O/P NEW SF 15 MIN: CPT | Mod: 95

## 2023-01-27 NOTE — HISTORY OF PRESENT ILLNESS
[FreeTextEntry1] : ***UPDATE:2023***\par Appointment was conducted by audiovisual/telehealth at request of patient in place of a follow up office visit due to heightened concern for Corona virus infection risk.\par Verbal consent given on 2023 8:13 AM by the patient Ms. ANAHI HASSAN [birth year]  2 1995 who understands that the telephone visit will be charged to insurance and may involve co-pay for patient\par Nurse Practitioner location:office\par Patient location:home\par Individuals on call: KRYSTA Fernandes, Ms. AANHI HASSAN\par \par Ms Anahi Hassan remains seizure free. Her last documented seizure was ~9 years ago\par She recently moved out from her mothers home and is now living with her boyfriend. She reports  some stress and difficult sleeping since moving out in Sept of last year. Also some headaches when extremely overwhelmed .\par She is concerned as she is aware that sleep deprivation and stress can lower the seizure threshold\par \par Levetiracetam 750 mg BID\par \par \par \par ***UPDATE:2022***\par Appointment was conducted by audiovisual/telehealth at request of patient in place of a follow up office visit due to heightened concern for Corona virus infection risk.\par Verbal consent given on May 31, 2022 8:55 AM by the patient Ms. ANAHI HASSAN [birth year]  2 1995 who understands that the telephone visit will be charged to insurance and may involve co-pay for patient\par Nurse Practitioner location:office\par Patient location:home\par Individuals on call: KRYSTA Fernandes, Ms. ANAHI HASSAN\par \par Ms Anahi Hassan is doing well with no reported interval seizures\par Her last documented seizures was ~ 8.5 years ago\par \par Levetiracetam 750 mg BID\par \par She recently graduated from [employer] and would like to be a mental health counseling. Plans to apply to PHD program\par \par \par ***UPDATE:2020***\par Ms Anahi Hassan is here today for a follow up visit. Her last documented seizure was ~ 7 years ago\par recent REEG in October -normal with no epileptiform discharges\par \par Levetiracetam 1000/1500\par \par \par \par 24 yo RH woman with a history of focal epilepsy since the age of 11.  Her last seizure was about 7 years ago, and she has been compliant on Keppra monotherapy.  She also has arthrogryposis affecting the distal limbs and limiting hand dexterity.\par \par Her Keppra had been switched to brand only in the past due to suspected side effects, but now she is back on generic Levetiracetam, feeling fine on it.\par \par She has been having frequent headaches, often provoked by stress.  Currently occurring about once a week.  The pain is posterior, can be throbbing or a pressure sensation.  No nausea or vomiting.  +phonophobia.  Duration is about 1 hour.  She takes Excederin prn which is effective. \par \par Her mother gets migraine headaches, and her father also gets migraine headaches. \par \par Seizure description: she gets an aura of "dizziness" then loses awareness, and may have a secondary GTC seizure. \par \par Epilepsy Risk Factors:  complications and born pre-mature, 7 months, but no febrile seizures, no developmental delay, no head trauma, no meningitis or encephalitis, no stroke  and no family history of seizures \par \par EEG (done today and in the past have been normal)\par \par MRI brain (): normal\par \par Current AEDs:\par Levetiracetam 750mg PO BID\par \par Previous AEDs:\par None

## 2023-01-27 NOTE — ASSESSMENT
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   Patient agrees to continued seizure prophylaxis.\par \par Plan:\par 1. Continue Levetiracetam 750mg PO BID   f\par 2. annual labwork including AED level \par 3. reviewed seizure triggers   not currently planning pregnancy\par 4. update 48 hr AEEG [facility]\par 5.MRI Brain -new onset headaches\par 4. Follow up in 1 year \par

## 2023-01-30 ENCOUNTER — NON-APPOINTMENT (OUTPATIENT)
Age: 28
End: 2023-01-30
